# Patient Record
Sex: FEMALE | Race: WHITE | NOT HISPANIC OR LATINO | URBAN - METROPOLITAN AREA
[De-identification: names, ages, dates, MRNs, and addresses within clinical notes are randomized per-mention and may not be internally consistent; named-entity substitution may affect disease eponyms.]

---

## 2020-01-01 ENCOUNTER — INPATIENT (INPATIENT)
Facility: HOSPITAL | Age: 85
LOS: 0 days | End: 2020-08-07
Attending: INTERNAL MEDICINE | Admitting: INTERNAL MEDICINE
Payer: MEDICARE

## 2020-01-01 VITALS
WEIGHT: 123.02 LBS | HEART RATE: 68 BPM | DIASTOLIC BLOOD PRESSURE: 43 MMHG | SYSTOLIC BLOOD PRESSURE: 63 MMHG | TEMPERATURE: 98 F | OXYGEN SATURATION: 95 % | RESPIRATION RATE: 17 BRPM | HEIGHT: 62 IN

## 2020-01-01 VITALS
TEMPERATURE: 101 F | OXYGEN SATURATION: 100 % | SYSTOLIC BLOOD PRESSURE: 69 MMHG | DIASTOLIC BLOOD PRESSURE: 45 MMHG | HEART RATE: 118 BPM | RESPIRATION RATE: 20 BRPM

## 2020-01-01 LAB
ALBUMIN SERPL ELPH-MCNC: 3.2 G/DL — LOW (ref 3.5–5.2)
ALP SERPL-CCNC: 32 U/L — SIGNIFICANT CHANGE UP (ref 30–115)
ALT FLD-CCNC: 18 U/L — SIGNIFICANT CHANGE UP (ref 0–41)
ANION GAP SERPL CALC-SCNC: 14 MMOL/L — SIGNIFICANT CHANGE UP (ref 7–14)
APPEARANCE UR: ABNORMAL
AST SERPL-CCNC: 41 U/L — SIGNIFICANT CHANGE UP (ref 0–41)
BACTERIA # UR AUTO: ABNORMAL
BASOPHILS # BLD AUTO: 0.03 K/UL — SIGNIFICANT CHANGE UP (ref 0–0.2)
BASOPHILS NFR BLD AUTO: 0.3 % — SIGNIFICANT CHANGE UP (ref 0–1)
BILIRUB SERPL-MCNC: 0.5 MG/DL — SIGNIFICANT CHANGE UP (ref 0.2–1.2)
BILIRUB UR-MCNC: NEGATIVE — SIGNIFICANT CHANGE UP
BUN SERPL-MCNC: 19 MG/DL — SIGNIFICANT CHANGE UP (ref 10–20)
CALCIUM SERPL-MCNC: 8.3 MG/DL — LOW (ref 8.5–10.1)
CHLORIDE SERPL-SCNC: 102 MMOL/L — SIGNIFICANT CHANGE UP (ref 98–110)
CO2 SERPL-SCNC: 23 MMOL/L — SIGNIFICANT CHANGE UP (ref 17–32)
COLOR SPEC: ABNORMAL
CREAT SERPL-MCNC: 0.9 MG/DL — SIGNIFICANT CHANGE UP (ref 0.7–1.5)
DIFF PNL FLD: ABNORMAL
EOSINOPHIL # BLD AUTO: 0.01 K/UL — SIGNIFICANT CHANGE UP (ref 0–0.7)
EOSINOPHIL NFR BLD AUTO: 0.1 % — SIGNIFICANT CHANGE UP (ref 0–8)
EPI CELLS # UR: >27 /HPF — HIGH (ref 0–5)
GLUCOSE SERPL-MCNC: 110 MG/DL — HIGH (ref 70–99)
GLUCOSE UR QL: NEGATIVE — SIGNIFICANT CHANGE UP
HCT VFR BLD CALC: 39.6 % — SIGNIFICANT CHANGE UP (ref 37–47)
HGB BLD-MCNC: 11.9 G/DL — LOW (ref 12–16)
HYALINE CASTS # UR AUTO: >145 /LPF — HIGH (ref 0–7)
IMM GRANULOCYTES NFR BLD AUTO: 2.1 % — HIGH (ref 0.1–0.3)
KETONES UR-MCNC: NEGATIVE — SIGNIFICANT CHANGE UP
LACTATE BLDV-MCNC: 4.2 MMOL/L — HIGH (ref 0.5–1.6)
LACTATE SERPL-SCNC: 4.9 MMOL/L — CRITICAL HIGH (ref 0.7–2)
LEUKOCYTE ESTERASE UR-ACNC: ABNORMAL
LYMPHOCYTES # BLD AUTO: 1.62 K/UL — SIGNIFICANT CHANGE UP (ref 1.2–3.4)
LYMPHOCYTES # BLD AUTO: 15.7 % — LOW (ref 20.5–51.1)
MCHC RBC-ENTMCNC: 28.3 PG — SIGNIFICANT CHANGE UP (ref 27–31)
MCHC RBC-ENTMCNC: 30.1 G/DL — LOW (ref 32–37)
MCV RBC AUTO: 94.1 FL — SIGNIFICANT CHANGE UP (ref 81–99)
MONOCYTES # BLD AUTO: 0.68 K/UL — HIGH (ref 0.1–0.6)
MONOCYTES NFR BLD AUTO: 6.6 % — SIGNIFICANT CHANGE UP (ref 1.7–9.3)
NEUTROPHILS # BLD AUTO: 7.76 K/UL — HIGH (ref 1.4–6.5)
NEUTROPHILS NFR BLD AUTO: 75.2 % — SIGNIFICANT CHANGE UP (ref 42.2–75.2)
NITRITE UR-MCNC: NEGATIVE — SIGNIFICANT CHANGE UP
NRBC # BLD: 0 /100 WBCS — SIGNIFICANT CHANGE UP (ref 0–0)
PH UR: 5.5 — SIGNIFICANT CHANGE UP (ref 5–8)
PLATELET # BLD AUTO: 231 K/UL — SIGNIFICANT CHANGE UP (ref 130–400)
POTASSIUM SERPL-MCNC: 5.9 MMOL/L — HIGH (ref 3.5–5)
POTASSIUM SERPL-SCNC: 5.9 MMOL/L — HIGH (ref 3.5–5)
PROT SERPL-MCNC: 5.3 G/DL — LOW (ref 6–8)
PROT UR-MCNC: ABNORMAL
RBC # BLD: 4.21 M/UL — SIGNIFICANT CHANGE UP (ref 4.2–5.4)
RBC # FLD: 16.6 % — HIGH (ref 11.5–14.5)
RBC CASTS # UR COMP ASSIST: 69 /HPF — HIGH (ref 0–4)
SODIUM SERPL-SCNC: 139 MMOL/L — SIGNIFICANT CHANGE UP (ref 135–146)
SP GR SPEC: 1.02 — SIGNIFICANT CHANGE UP (ref 1.01–1.02)
UROBILINOGEN FLD QL: ABNORMAL
WBC # BLD: 10.32 K/UL — SIGNIFICANT CHANGE UP (ref 4.8–10.8)
WBC # FLD AUTO: 10.32 K/UL — SIGNIFICANT CHANGE UP (ref 4.8–10.8)
WBC UR QL: 44 /HPF — HIGH (ref 0–5)

## 2020-01-01 PROCEDURE — 99221 1ST HOSP IP/OBS SF/LOW 40: CPT

## 2020-01-01 PROCEDURE — 99291 CRITICAL CARE FIRST HOUR: CPT | Mod: CS,GC

## 2020-01-01 PROCEDURE — 93010 ELECTROCARDIOGRAM REPORT: CPT

## 2020-01-01 PROCEDURE — 99223 1ST HOSP IP/OBS HIGH 75: CPT | Mod: AI

## 2020-01-01 PROCEDURE — 71045 X-RAY EXAM CHEST 1 VIEW: CPT | Mod: 26

## 2020-01-01 RX ORDER — CEFEPIME 1 G/1
2000 INJECTION, POWDER, FOR SOLUTION INTRAMUSCULAR; INTRAVENOUS ONCE
Refills: 0 | Status: COMPLETED | OUTPATIENT
Start: 2020-01-01 | End: 2020-01-01

## 2020-01-01 RX ORDER — SODIUM CHLORIDE 9 MG/ML
1700 INJECTION, SOLUTION INTRAVENOUS ONCE
Refills: 0 | Status: COMPLETED | OUTPATIENT
Start: 2020-01-01 | End: 2020-01-01

## 2020-01-01 RX ORDER — MORPHINE SULFATE 50 MG/1
2 CAPSULE, EXTENDED RELEASE ORAL ONCE
Refills: 0 | Status: DISCONTINUED | OUTPATIENT
Start: 2020-01-01 | End: 2020-01-01

## 2020-01-01 RX ORDER — MORPHINE SULFATE 50 MG/1
2 CAPSULE, EXTENDED RELEASE ORAL
Refills: 0 | Status: DISCONTINUED | OUTPATIENT
Start: 2020-01-01 | End: 2020-01-01

## 2020-01-01 RX ADMIN — MORPHINE SULFATE 2 MILLIGRAM(S): 50 CAPSULE, EXTENDED RELEASE ORAL at 16:26

## 2020-01-01 RX ADMIN — CEFEPIME 100 MILLIGRAM(S): 1 INJECTION, POWDER, FOR SOLUTION INTRAMUSCULAR; INTRAVENOUS at 11:45

## 2020-01-01 RX ADMIN — SODIUM CHLORIDE 1700 MILLILITER(S): 9 INJECTION, SOLUTION INTRAVENOUS at 10:45

## 2020-01-01 RX ADMIN — MORPHINE SULFATE 2 MILLIGRAM(S): 50 CAPSULE, EXTENDED RELEASE ORAL at 16:27

## 2020-08-07 NOTE — H&P ADULT - NSHPLABSRESULTS_GEN_ALL_CORE
LABS:                        11.9   10.32 )-----------( 231      ( 07 Aug 2020 11:50 )             39.6     08-    139  |  102  |  19  ----------------------------<  110<H>  5.9<H>   |  23  |  0.9    Ca    8.3<L>      07 Aug 2020 11:50    TPro  5.3<L>  /  Alb  3.2<L>  /  TBili  0.5  /  DBili  x   /  AST  41  /  ALT  18  /  AlkPhos  32        Urinalysis Basic - ( 07 Aug 2020 11:45 )    Color: Kathleen / Appearance: Turbid / S.021 / pH: x  Gluc: x / Ketone: Negative  / Bili: Negative / Urobili: 6 mg/dL   Blood: x / Protein: 30 mg/dL / Nitrite: Negative   Leuk Esterase: Small / RBC: 69 /HPF / WBC 44 /HPF   Sq Epi: x / Non Sq Epi: >27 /HPF / Bacteria: Few          RADIOLOGY:  < from: Xray Chest 1 View AP/PA (20 @ 10:51) >    Lung parenchyma/Pleura: Basilar atelectasis.    Skeleton/soft tissues: Bones are demineralized.    Impression:    Low lung volumes leads to magnification of the pulmonary interstitium.    < end of copied text >

## 2020-08-07 NOTE — H&P ADULT - ASSESSMENT
Patient is a 89 yo female with a PMHx significant for HTN, CH, arthritis, chronically bedbound was brought in after she was found to be unresponsive by her sister. History obtained from patient's sister who is her primary caregiver at home.     # AMS/Unresponsive likely 2/2 hypoxia vs   - SpO2 90% on admission   - on non rebreather   - lactate 4.2  - Patient is DNR/DNI, her son is healthcare proxy and would like her to get comfort measures only   - Consult palliative care Palliative       #hyperkalemia   - K of 5.9  - start insulin and dextrose           #Misc  - DVT Prophylaxis: SCDs  - Diet:   - GI Prophylaxis:   - Activity: bedbound  - IV Fluids:   - Code Status: DNR/DNI  - Dispo: Patient is a 89 yo female with a PMHx significant for HTN, CH, arthritis, chronically bedbound was brought in after she was found to be unresponsive by her sister. History obtained from patient's sister who is her primary caregiver at home.       # Severe sepsis secondary to likely acute pyelonephritis  - SpO2 90% on admission; hypotensive, WBC 10.3  - On non rebreather satting at low 75%, CXR shows no signs of focal consolidation  - Lactate 4.2  - S/p Cefepime and sepsis fluid protocol but still hypotensive  - As per her son, who is the healthcare proxy, he would like her to get comfort measures only: DNR/DNI, no central line, no aggressive intervention, no blood draws, no IVF, no antibiotics  - Pending Palliative care consult, they will meet with family  - Can give PRN morphine or pain or respiratory distress, PRN ativan for anxiety      # Hyperkalemia   - K of 5.9  - Due to comfort measure only, will not intervene      #Misc  - DVT Prophylaxis: SCDs  - Diet: PO diet as tolerated  - GI Prophylaxis: Not indicated  - Activity: bedbound   - IV Fluids: None  - Code Status: DNR/DNI, MOLST in chart  - Dispo: comfort measure only      * I have reviewed this admission note with PGY-1 and edited it when appropriate*    Nae Neves, DO  PGY-3

## 2020-08-07 NOTE — ED PROVIDER NOTE - OBJECTIVE STATEMENT
89yF pmhx HTN, CHF, arthritis, chronically bedbound sent in by NH for hypotension, hypoxia, AMS this morning; per son Gerber, pt feeling unwell past 3wks, had been treated for UTI, per NH worsened this morning.

## 2020-08-07 NOTE — CONSULT NOTE ADULT - SUBJECTIVE AND OBJECTIVE BOX
REQUESTED OF: DR. OJEDA    Chart reviewed, Hospital Day 1    MI MATAMOROS 89yFemale  HPI:  Patient is a 89 yo female with a PMHx significant for HTN, CH, arthritis, chronically bedbound was brought in after she was found to be unresponsive by her sister. History obtained from patient's sister who is her primary caregiver at home. As per patient's sister she has been lethargic and "tired' for the past couple of days and this morning she was unresponsive. Patient is being managed by Dr. Goodman, who visits the patient at home. She was recently treated for a UTI by her physician during one of her visits 2 weeks ago and received antibiotics. During that visit she was noted to have low BP and "electrolyte imbalance" and was given IVF. (07 Aug 2020 13:52)    Patient quickly declining and son expressing interest in comfort care, made patient DNR/DNI. Currently with NRB, hypotensive.   Per son, worsening status and feeling unwell the past few weeks.       PAST MEDICAL & SURGICAL HISTORY:  CHF (congestive heart failure)  HTN (hypertension)  No significant past surgical history      Subjective and Objective:  Today,  Discussed with ED team and son.     Focused Palliative Care Evaluation:                   Symptoms:                                      Pain                                     Dyspnea                                      N/V                                     Appetite                                     Anxiety                                     Other _____________________                     Support Devices: NRB             PHYSICAL EXAM:      Constitutional: Lying in bed, NAD, appears stated age    Respiratory: On NRB, no distress    Abdomen: soft, nondistended    Extremities: WNL    Neurological: Obtunded    Skin: see nursing assessment         T(C): 36.5, Max: 38.6 (10:43)  HR: 68 (68 - 118)  BP: 63/43 (63/43 - 79/46)  RR: 17 (9 - 20)  SpO2: 95% (70% - 95%)      LABS/STUDIES:  08-07    139  |  102  |  19  ----------------------------<  110<H>  5.9<H>   |  23  |  0.9    Ca    8.3<L>      07 Aug 2020 11:50    TPro  5.3<L>  /  Alb  3.2<L>  /  TBili  0.5  /  DBili  x   /  AST  41  /  ALT  18  /  AlkPhos  32  08-07                            11.9   10.32 )-----------( 231      ( 07 Aug 2020 11:50 )             39.6       MEDICATIONS  (STANDING):    MEDICATIONS  (PRN):  LORazepam   Injectable 1 milliGRAM(s) IV Push every 4 hours PRN Agitation  morphine  - Injectable 2 milliGRAM(s) IV Push every 2 hours PRN Respiratory Distress          iStop: Reviewed        PPS  Level  10%       Note PPS = Palliative Performance Scale; (c)2001, [facility] Society       Range from 100% meaning Full ambulation/self-care/intake/Level of Consicous                                                                              to        10% meaning Bedbound/Unable to do any activity/extensive disease /Total Care/ No PO intake/ LOC=Full/drowsy/+/-confusion        (0% = death)                     Prior to acute illness, patient's functionality reportedly bed bound. In NH.

## 2020-08-07 NOTE — CHART NOTE - NSCHARTNOTEFT_GEN_A_CORE
Patient  of cardiopulmonary arrest due to chronic CHF caused by hypertension today . She was DRN/DNI an comfort measures. Family (son )was at the bed side.     Patient was a 89 yo female with a PMHx significant for HTN, CHF, arthritis, chronically bedbound was brought in after she was found to be unresponsive by her sister. History obtained from patient's sister who was her primary caregiver at home. As per patient's sister she had been lethargic and "tired' for the past couple of days and this morning she was unresponsive. Patient was being managed by Dr. Goodman.  She was recently treated for a UTI by her physician during one of her visits 2 weeks ago and received antibiotics. During that visit she was noted to have low BP and "electrolyte imbalance" and was given IVF. (07 Aug 2020 13:52)    Patient was  quickly declining and son expressing interest in comfort care, made patient DNR/DNI.   Interventions   Comfort measures- MOLST updated.  No draws, No masks, no IV fluids, no medications not aimed at patient comfort as per family wishes   Morphine 2 mg IVP Q 2 H PRN for respiratory distress or pain  Ativan 0.5 mg IVP Q 6 H PRN for agitation

## 2020-08-07 NOTE — ED ADULT NURSE NOTE - NSIMPLEMENTINTERV_GEN_ALL_ED
Implemented All Universal Safety Interventions:  [place] to call system. Call bell, personal items and telephone within reach. Instruct patient to call for assistance. Room bathroom lighting operational. Non-slip footwear when patient is off stretcher. Physically safe environment: no spills, clutter or unnecessary equipment. Stretcher in lowest position, wheels locked, appropriate side rails in place.

## 2020-08-07 NOTE — GOALS OF CARE CONVERSATION - ADVANCED CARE PLANNING - CONVERSATION DETAILS
Palliative Care team met with pt.'s son/surrogate, Gerber Mccann.  Palliative Care introduced and pt.'s clinical status reviewed.  Son has a clear understanding of pt.'s overall condition; all questions answered in detail.  Reviewed treatment options and son is clear in his wish for DNR/I and CMO, understanding that pt. will likely pass away in the hospital.  Additionally discussed removal of NRB as a means for comfort, which son understands and agrees to.  DEB completed.  Chaplaincy referral made requesting [facility] by [facility].

## 2020-08-07 NOTE — H&P ADULT - NSHPPHYSICALEXAM_GEN_ALL_CORE
PHYSICAL EXAM:  CONSTITUTIONAL: No acute distress, well-developed, well-groomed, AAOx3  HEAD: Atraumatic, normocephalic  EYES: EOM intact, PERRLA, conjunctiva and sclera clear  ENT: Supple, no masses, no thyromegaly, no bruits, no JVD; moist mucous membranes  PULMONARY: Clear to auscultation bilaterally; no wheezes, rales, or rhonchi  CARDIOVASCULAR: Regular rate and rhythm; no murmurs, rubs, or gallops  GASTROINTESTINAL: Soft, non-tender, non-distended; bowel sounds present  MUSCULOSKELETAL: 2+ peripheral pulses; no clubbing, no cyanosis, no edema  NEUROLOGY: non-focal  SKIN: No rashes or lesions; warm and dry PHYSICAL EXAM:  CONSTITUTIONAL: Unresponsive  HEAD: Atraumatic, normocephalic  EYES: PERRLA, conjunctiva and sclera clear  ENT: Supple, no masses, no thyromegaly, no bruits, no JVD; moist mucous membranes  PULMONARY: Clear to auscultation bilaterally; tachypneic and labored breathing  CARDIOVASCULAR: Regular rate and rhythm; no murmurs, rubs, or gallops  GASTROINTESTINAL: Soft, non-tender, non-distended; bowel sounds present  MUSCULOSKELETAL: 2+ peripheral pulses; no clubbing, no cyanosis, no edema  NEUROLOGY: Unresponsive, unable to perform full assessment  SKIN: No rashes or lesions

## 2020-08-07 NOTE — H&P ADULT - HISTORY OF PRESENT ILLNESS
Patient is a 89 yo female with a PMHx significant for HTN, CH, arthritis, chronically bedbound was brought in after she was found to be unresponsive by her sister. History obtained from patient's sister who is her primary caregiver at home. As per patient's sister she has been lethargic and "tired' for the past couple of days and this morning she was unresponsive. Patient is being managed by Dr. Goodman, who visits the patient at home. She was recently treated for a UTI by her physician during one of her visits 2 weeks ago and received antibiotics. During that visit she was noted to have low BP and "electrolyte imbalance" and was given IVF.

## 2020-08-07 NOTE — ED PROVIDER NOTE - PHYSICAL EXAMINATION
Vital Signs: Reviewed  GEN: obtunded, does not respond to pain  HEAD:  normocephalic, atraumatic  EYES:  PERRLA; conjunctivae without injection, drainage or discharge  ENMT:  nasal mucosa moist; mouth moist without ulcerations or lesions; throat moist without erythema, exudate, ulcerations or lesions  NECK:  supple  CARDIAC:  tachycardic, irregular  RESP:  respiratory rate and effort appear normal for age; lungs are clear to auscultation bilaterally; no rales or wheezes  ABDOMEN:  soft, nontender, nondistended  MUSCULOSKELETAL/NEURO:  no spontaneous extremity movement  SKIN:  normal skin color for age and race, well-perfused; warm and dry

## 2020-08-07 NOTE — ED PROVIDER NOTE - PROGRESS NOTE DETAILS
AG: conversation w/ pt's son Gerber [phone], pt bedbound for the past 9yrs, poor quality of life, would like us to try to treat her but DNR/DNI/no central line, just to "keep her comfortable." Recently treated for UTI, has not been feeling well for past 3wks.

## 2020-08-07 NOTE — ED PROVIDER NOTE - CLINICAL SUMMARY MEDICAL DECISION MAKING FREE TEXT BOX
89 y/o f brought to ed with weakness. Pt with sepsis, tx initiated. D/w family and pt made DNI/DNR and comfort care only. ALL DIAGNOSTIC TESTING REVIEWED. Pt admitted to medicine.

## 2020-08-07 NOTE — H&P ADULT - ATTENDING COMMENTS
Severe sepsis secondary to the pyelonephritis complicated with metabolic encephalopathy.   Patient is functionally quadriplegic. need assistance for all ADL.   Family requested for comfort measures.   Palliative to follow

## 2020-08-07 NOTE — ED PROVIDER NOTE - ATTENDING CONTRIBUTION TO CARE
I personally evaluated the patient. I reviewed the Resident’s or Physician Assistant’s note (as assigned above), and agree with the findings and plan except as documented in my note.   89 Y/O F HTN, CHF, BED BOUND BROUGHT TO ED FROM HOME WITH 2-3 DAYS OF LETHARGY I personally evaluated the patient. I reviewed the Resident’s or Physician Assistant’s note (as assigned above), and agree with the findings and plan except as documented in my note.   89 Y/O F HTN, CHF, BED BOUND BROUGHT TO ED FROM HOME WITH 2-3 DAYS OF LETHARGY AND THIS AM WAS FOUND POORLY RESPONSIVE BY HER SISTER. PT HAD BEEN TREATED FOR A UTI SEVERAL WEEKS AGO. PT UNABLE TO GIVE HX DUE TO SEVERITY OF ILLNESS. HX AS PER SISTER AND SON BY TELEPHONE. NO V/D. NO COUGH. VITALS NOTED. RESPONSIVE TO VERBAL STIMULI. NOT ANSWERING QUESTIONS OR FOLLOWING COMMANDS. NCAT PERRL. EOMI. OP NORMAL. DRY MUCOUS MEMBRANES.. NECK SUPPLE. LUNGS CLEAR B/L. TACHYCARDIA S1S2. ABD- SOFT NONTENDER. NO LEG EDEMA. CN 2-12 INTACT. NEURO EXAM NONFOCAL.

## 2020-08-07 NOTE — CONSULT NOTE ADULT - ASSESSMENT
Consult Summary    Patient is a 89 yr old woman previously bed bound for years coming in from nursing home, septic likely 2/2 UTI who is acutely worsening with hypotension, respiratory failure.   Spoke with son about options including comfort measures, which he and his wife would like to pursue.     2 mg Morphine IVP given prior to removal of NRB mask.   Support provided to family at bedside.     MEDD: 0    20 minutes spent discussing Advance Care Planning.       Recommendations:  Comfort measures- MOLST updated  - no draws  - no masks  - no IV fluids  - no medications not aimed at patient comfort  Morphine 2 mg IVP Q 2 H PRN for respiratory distress or pain  Ativan 0.5 mg IVP Q 6 H PRN for agitation  Palliative will follow       Please Call [phone] PRN

## 2020-08-07 NOTE — ED PROVIDER NOTE - CRITICAL CARE PROVIDED
interpretation of diagnostic studies/documentation/conducted a detailed discussion of DNR status/additional history taking/direct patient care (not related to procedure)/consult w/ pt's family directly relating to pts condition

## 2020-08-08 LAB — SARS-COV-2 RNA SPEC QL NAA+PROBE: SIGNIFICANT CHANGE UP

## 2020-08-09 LAB
CULTURE RESULTS: SIGNIFICANT CHANGE UP
SPECIMEN SOURCE: SIGNIFICANT CHANGE UP

## 2020-08-12 LAB
CULTURE RESULTS: SIGNIFICANT CHANGE UP
CULTURE RESULTS: SIGNIFICANT CHANGE UP
SPECIMEN SOURCE: SIGNIFICANT CHANGE UP
SPECIMEN SOURCE: SIGNIFICANT CHANGE UP

## 2020-08-21 DIAGNOSIS — Z74.01 BED CONFINEMENT STATUS: ICD-10-CM

## 2020-08-21 DIAGNOSIS — J96.90 RESPIRATORY FAILURE, UNSPECIFIED, UNSPECIFIED WHETHER WITH HYPOXIA OR HYPERCAPNIA: ICD-10-CM

## 2020-08-21 DIAGNOSIS — E87.5 HYPERKALEMIA: ICD-10-CM

## 2020-08-21 DIAGNOSIS — N12 TUBULO-INTERSTITIAL NEPHRITIS, NOT SPECIFIED AS ACUTE OR CHRONIC: ICD-10-CM

## 2020-08-21 DIAGNOSIS — I50.9 HEART FAILURE, UNSPECIFIED: ICD-10-CM

## 2020-08-21 DIAGNOSIS — Z51.5 ENCOUNTER FOR PALLIATIVE CARE: ICD-10-CM

## 2020-08-21 DIAGNOSIS — G93.41 METABOLIC ENCEPHALOPATHY: ICD-10-CM

## 2020-08-21 DIAGNOSIS — Z66 DO NOT RESUSCITATE: ICD-10-CM

## 2020-08-21 DIAGNOSIS — A41.9 SEPSIS, UNSPECIFIED ORGANISM: ICD-10-CM

## 2020-08-21 DIAGNOSIS — R65.20 SEVERE SEPSIS WITHOUT SEPTIC SHOCK: ICD-10-CM

## 2020-08-21 DIAGNOSIS — M19.90 UNSPECIFIED OSTEOARTHRITIS, UNSPECIFIED SITE: ICD-10-CM

## 2020-08-21 DIAGNOSIS — I11.0 HYPERTENSIVE HEART DISEASE WITH HEART FAILURE: ICD-10-CM

## 2021-06-25 NOTE — DISCHARGE NOTE FOR THE EXPIRED PATIENT - HOSPITAL COURSE
Patient  of cardiopulmonary arrest due to chronic CHF caused by hypertension today . She was DRN/DNI an comfort measures. Family (son )was at the bed side.     Patient was a 89 yo female with a PMHx significant for HTN, CHF, arthritis, chronically bedbound was brought in after she was found to be unresponsive by her sister. History obtained from patient's sister who was her primary caregiver at home. As per patient's sister she had been lethargic and "tired' for the past couple of days and this morning she was unresponsive. Patient was being managed by Dr. Goodman.  She was recently treated for a UTI by her physician during one of her visits 2 weeks ago and received antibiotics. During that visit she was noted to have low BP and "electrolyte imbalance" and was given IVF. (07 Aug 2020 13:52)    Patient was  quickly declining and son expressing interest in comfort care, made patient DNR/DNI.   Interventions   Comfort measures- MOLST updated.  No draws, No masks, no IV fluids, no medications not aimed at patient comfort as per family wishes   Morphine 2 mg IVP Q 2 H PRN for respiratory distress or pain  Ativan 0.5 mg IVP Q 6 H PRN for agitation.

## 2023-11-09 NOTE — H&P ADULT - NSHPLANGLIMITEDENGLISH_GEN_A_CORE
No
[facility]  Radiology  [address] [facility], [address]  [place] [address]  Phone: [phone]  Fax: [phone]  Follow Up Time: